# Patient Record
Sex: MALE | Race: WHITE | NOT HISPANIC OR LATINO | Employment: OTHER | URBAN - METROPOLITAN AREA
[De-identification: names, ages, dates, MRNs, and addresses within clinical notes are randomized per-mention and may not be internally consistent; named-entity substitution may affect disease eponyms.]

---

## 2018-07-30 ENCOUNTER — OFFICE VISIT - HEALTHEAST (OUTPATIENT)
Dept: FAMILY MEDICINE | Facility: CLINIC | Age: 48
End: 2018-07-30

## 2018-07-30 DIAGNOSIS — R19.8 PULSATILE ABDOMEN: ICD-10-CM

## 2018-07-31 ENCOUNTER — COMMUNICATION - HEALTHEAST (OUTPATIENT)
Dept: SCHEDULING | Facility: CLINIC | Age: 48
End: 2018-07-31

## 2018-08-01 ENCOUNTER — COMMUNICATION - HEALTHEAST (OUTPATIENT)
Dept: FAMILY MEDICINE | Facility: CLINIC | Age: 48
End: 2018-08-01

## 2018-08-01 ENCOUNTER — COMMUNICATION - HEALTHEAST (OUTPATIENT)
Dept: INTERNAL MEDICINE | Facility: CLINIC | Age: 48
End: 2018-08-01

## 2019-10-08 ENCOUNTER — COMMUNICATION - HEALTHEAST (OUTPATIENT)
Dept: FAMILY MEDICINE | Facility: CLINIC | Age: 49
End: 2019-10-08

## 2021-06-01 VITALS — BODY MASS INDEX: 27.36 KG/M2 | WEIGHT: 201.7 LBS

## 2021-06-19 NOTE — LETTER
Letter by Zainab Meyer MD at      Author: Zainab Meyer MD Service: -- Author Type: --    Filed:  Encounter Date: 10/8/2019 Status: Signed         Humberto Rosado  [address] [place] [address]      2019      Dear Humberto Rosado,   : 1970      This letter is in regards to the appointment that you had scheduled on 10/07/19 at the [facility] with Dr. Meyer.     The [facility] strives to see all patients in a timely manner and we need your help to achieve this.  The above-mentioned appointment was missed and we do not have record of a cancellation by you.  Whenever possible, we request appointment cancellations at least 72 hours in advance.  This time allows us to offer the appointment to another patient in need.      If you feel you have received this letter in error, or if you need to reschedule this appointment, please call our office so that we may update our records.      Sincerely,    [facility]

## 2021-06-19 NOTE — PROGRESS NOTES
Assessment/Plan:        1. 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  Exam findings were discussed, and plan on- US Abdominal Aorta; Future    We will follow-up pending the results of the study to manage accordingly             Subjective:    Patient ID:   Humberto Rosado is a 48 y.o. male comes in follow-up of pulsating abdomen as noted by the examiner seen at the [facility] for DOT physical.  The concern is relating to AAA and said to follow-up with primary care for further evaluation    Patient denies any abdominal or lower back pain and denies having any other symptoms.        Review of Systems  General : negative  Respiratory : no cough, shortness of breath, or wheezing  Cardiovascular : no chest pain or dyspnea on exertion  Gastrointestinal : no abdominal pain, change in bowel habits, or black or bloody stools  Genito-Urinary :  no dysuria, trouble voiding, or hematuria  Musculoskeletal : negative  Neurological : negative  Dermatological : negative    Allergy: reviewed      The following patient's history were reviewed and updated as appropriate:   He  has no past medical history on file.  He  does not have any pertinent problems on file.  He  has a past surgical history that includes Knee arthroscopy (Left) and Vasectomy.  His family history includes Breast cancer in his mother; No Medical Problems in his brother, father, and sister.  He  reports that he has never smoked. He has never used smokeless tobacco. He reports that he drinks about 1.8 oz of alcohol per week  He reports that he does not use illicit drugs..      Outpatient Encounter Prescriptions as of 7/30/2018   Medication Sig Dispense Refill     predniSONE (DELTASONE) 20 MG tablet 60 mg daily for 3 days then 40mg daily for 7 days then 20mg daily for 7 days then 10mg daily for 10 days 35 tablet 0     No facility-administered encounter  medications on file as of 7/30/2018.          Objective:   /68 (Patient Site: Right Arm, Patient Position: Sitting, Cuff Size: Adult Regular)  Pulse 71  Temp 98.4  F (36.9  C) (Oral)   Wt 201 lb 11.2 oz (91.5 kg)  SpO2 97%  BMI 27.36 kg/m2      Physical Exam  General Appearance:    Alert, well hydrated, no distress,    Neck:   Supple, symmetrical, trachea midline, no adenopathy;        thyroid:  No enlargement/tenderness/nodules; no carotid    bruit or JVD   Lungs:     Clear to auscultation bilaterally, respirations unlabored   Heart:    Regular rate and rhythm, S1 and S2 normal, no murmur, rub   or gallop   Abdomen:     Soft, non-tender, normal bowel sounds, no rebound or guarding, no masses, no organomegaly, no appreciable pulsation   Extremities:   Extremities normal, atraumatic, no cyanosis or edema   Skin:   Skin color, texture, turgor normal, no rashes or lesions